# Patient Record
Sex: FEMALE | Race: WHITE | Employment: STUDENT | URBAN - METROPOLITAN AREA
[De-identification: names, ages, dates, MRNs, and addresses within clinical notes are randomized per-mention and may not be internally consistent; named-entity substitution may affect disease eponyms.]

---
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
## 2021-07-16 PROBLEM — F32.9 MAJOR DEPRESSIVE DISORDER: Chronic | Status: ACTIVE | Noted: 2020-12-22

## 2023-03-13 NOTE — TELEPHONE ENCOUNTER
Mom requesting referral for physiatrist and therapist
[facility] states pt now has [facility] insurance- pt sees a therapist[facility]iatrist [facility]) already- outpatient program- pt is on Zoloft and Abilify- pt is doing well on medications right now- no suicidal thoughts or homicidal thoughts- Paulette Frazier
agree
No

## (undated) NOTE — LETTER
10/18/2019              Al Porter        [id number] [facility] [address]         To whom it may concern,    Please note Xenia Lisa was seen today here in my office due to some health concerns, not feeling well.  I have ordered blood

## (undated) NOTE — LETTER
VACCINE ADMINISTRATION RECORD  PARENT / GUARDIAN APPROVAL  Date: 2018  Vaccine administered to: Jenny Perla     : 5/10/2000    MRN: [id number]    A copy of the appropriate Centers for Disease Control and Prevention Vaccine Information statement

## (undated) NOTE — LETTER
VACCINE ADMINISTRATION RECORD  PARENT / GUARDIAN APPROVAL  Date: 2018  Vaccine administered to: Marvin Rick     : 5/10/2000    MRN: [id number]    A copy of the appropriate Centers for Disease Control and Prevention Vaccine Information statement

## (undated) NOTE — LETTER
[facility] [employer] of Child Health Examination       Student's Name  Eloina Rojo Birth Pa Title                           Date  5/14/2018   Signature HEALTH HISTORY          TO BE COMPLETED AND SIGNED BY PARENT/GUARDIAN AND VERIFIED BY HEALTH CARE PROVIDER    ALLERGIES  (Food, drug, insect, other)  Patient has no known allergies.  MEDICATION  (List all prescribed or taken on a regular basis.)    Current PHYSICAL EXAMINATION REQUIREMENTS    Entire section below to be completed by MD/DO/APN/PA       PHYSICAL EXAMINATION REQUIREMENTS (head circumference if <33 years old):   /75   Pulse 76   Ht 5' 4.5\" (1.638 m)   Wt 84.8 kg (187 lb)   BMI 31.60 kg/m² Mouth/Dental Yes  Spinal examination Yes    Cardiovascular/HTN Yes  Nutritional status Yes    Respiratory Yes                   Diagnosis of Asthma: No Mental Health Yes        Currently Prescribed Asthma Medication:            Quick-relief  medication (e.